# Patient Record
Sex: MALE | Race: WHITE | NOT HISPANIC OR LATINO
[De-identification: names, ages, dates, MRNs, and addresses within clinical notes are randomized per-mention and may not be internally consistent; named-entity substitution may affect disease eponyms.]

---
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
## 2023-03-13 ENCOUNTER — LABORATORY RESULT (OUTPATIENT)
Age: 23
End: 2023-03-13

## 2023-03-14 ENCOUNTER — APPOINTMENT (OUTPATIENT)
Dept: UROLOGY | Facility: CLINIC | Age: 23
End: 2023-03-14
Payer: COMMERCIAL

## 2023-03-14 ENCOUNTER — TRANSCRIPTION ENCOUNTER (OUTPATIENT)
Age: 23
End: 2023-03-14

## 2023-03-14 VITALS
WEIGHT: 115 LBS | RESPIRATION RATE: 18 BRPM | BODY MASS INDEX: 18.48 KG/M2 | OXYGEN SATURATION: 99 % | HEART RATE: 82 BPM | HEIGHT: 66 IN | SYSTOLIC BLOOD PRESSURE: 107 MMHG | DIASTOLIC BLOOD PRESSURE: 67 MMHG

## 2023-03-14 DIAGNOSIS — N39.9 DISORDER OF URINARY SYSTEM, UNSPECIFIED: ICD-10-CM

## 2023-03-14 PROCEDURE — 99204 OFFICE O/P NEW MOD 45 MIN: CPT

## 2023-03-14 NOTE — HISTORY OF PRESENT ILLNESS
[FreeTextEntry1] : RAMON ARVIZU is a 23 year old male hx Asperger Syndrome who presents for consultation for periodic nocturia.\par  \par Ramon is here with his father who assisted w history.\par Reports that recently had episodes of nocturia where he was waking up possibly due to the need to void with minimal output.  At this point he is not having the symptoms.\aditi Had this problem approximate 10 years ago which self resolved., has been seen by Peds urology specialist where they said "his ureter/urethra is growing faster than (unclear)" according to father . He denies gross hematuria, dysuria or associated symptoms. He not constipated and reports a BM everyday \par \par Renal ultrasound images visualized from patients past pediatric urology evaluation and although there is no report on my read there is no hydronephrosis bilaterally kidneys appear unremarkable.\par \par Denies  PMH including previous kidney stones, recurrent UTIs. no hx pyelo or hospitalizations for urologic causes\par \par

## 2023-03-14 NOTE — ASSESSMENT
[FreeTextEntry1] : DELIA ARVIZU is a 23 year old male hx Asperger Syndrome who presents for consultation for periodic nocturia.\par  \par Symptoms now nearly resolved however recommending imaging as there was question of anatomical abnormality as per fathers history.\par Suspect behavioral causes\par \par Plan \par RBUS \par UA, UCX \par diagnosis and management impacted by social determinants of health as patient is a poor historian

## 2023-03-14 NOTE — ADDENDUM
[FreeTextEntry1] : Patient's note was transcribed with the assistance of a medical scribe under the supervision of Dr. Shaikh.\par I, Dr. Shaikh, have reviewed the patient's chart and agree that it aligns with my medical decisions.\par Angela Daly, our scribe, also served as a chaperone for physical examination purposes.\par \par \par

## 2023-03-14 NOTE — PHYSICAL EXAM
[General Appearance - Well Developed] : well developed [General Appearance - Well Nourished] : well nourished [Normal Appearance] : normal appearance [Well Groomed] : well groomed [General Appearance - In No Acute Distress] : no acute distress [Edema] : no peripheral edema [Respiration, Rhythm And Depth] : normal respiratory rhythm and effort [Exaggerated Use Of Accessory Muscles For Inspiration] : no accessory muscle use [Abdomen Soft] : soft [Abdomen Tenderness] : non-tender [Costovertebral Angle Tenderness] : no ~M costovertebral angle tenderness [Urethral Meatus] : meatus normal [Urinary Bladder Findings] : the bladder was normal on palpation [Scrotum] : the scrotum was normal [Normal Station and Gait] : the gait and station were normal for the patient's age [] : no rash [No Focal Deficits] : no focal deficits [Oriented To Time, Place, And Person] : oriented to person, place, and time [Affect] : the affect was normal [Mood] : the mood was normal [Not Anxious] : not anxious [No Palpable Adenopathy] : no palpable adenopathy [Testes Mass (___cm)] : there were no testicular masses

## 2023-03-16 ENCOUNTER — NON-APPOINTMENT (OUTPATIENT)
Age: 23
End: 2023-03-16

## 2023-03-16 LAB — BACTERIA UR CULT: NORMAL

## 2023-06-13 ENCOUNTER — APPOINTMENT (OUTPATIENT)
Dept: UROLOGY | Facility: CLINIC | Age: 23
End: 2023-06-13
Payer: COMMERCIAL

## 2023-06-13 VITALS
HEART RATE: 80 BPM | BODY MASS INDEX: 18.48 KG/M2 | WEIGHT: 115 LBS | TEMPERATURE: 97.1 F | HEIGHT: 66 IN | SYSTOLIC BLOOD PRESSURE: 108 MMHG | DIASTOLIC BLOOD PRESSURE: 60 MMHG | OXYGEN SATURATION: 96 % | RESPIRATION RATE: 16 BRPM

## 2023-06-13 DIAGNOSIS — R35.1 NOCTURIA: ICD-10-CM

## 2023-06-13 DIAGNOSIS — R80.9 PROTEINURIA, UNSPECIFIED: ICD-10-CM

## 2023-06-13 PROCEDURE — 99214 OFFICE O/P EST MOD 30 MIN: CPT

## 2023-06-13 NOTE — ASSESSMENT
[FreeTextEntry1] : DELIA ARVIZU is a 23 year old male hx Asperger Syndrome who presents for consultation for periodic nocturia.\par Proteinuria found on urinalysis\par \par Suspect behavioral causes of periodic nocturia. We discussed options such as further work-up with more invasive testing versus observation, pt and his father opt for observation at this time . \par \par plan \par Follow-up with primary care provider regarding proteinuria\par Recommending conservative management of nocturia at this time with eliminating fluids two hours before sleep, voiding prior to bedtime, decreasing bladder irritants\par f/u PRN \par \par \par \par Symptoms now nearly resolved however recommending imaging as there was question of anatomical abnormality as per fathers history.\par Suspect behavioral causes\par \par Plan \par RBUS \par UA, UCX \par diagnosis and management impacted by social determinants of health as patient is a poor historian

## 2023-06-13 NOTE — HISTORY OF PRESENT ILLNESS
[FreeTextEntry1] : RAMON ARVIZU is a 23 year old male hx Asperger Syndrome who presents for consultation for periodic nocturia.\par  \par Pt again had one episode of nocturia where he was waking up frequently to urinate. He denies any gross hematuria,dysuria or associated symptoms.  However this only occurred 1 time and otherwise no issues no constipation.Ramon is here with his father who assisted w history.\par \par RBUS 05/2023 images visualized from regional radiology and agree with the findings below.-  No hydronephrosis or shadowing renal stone with PVR of 23 ccs bladder is unremarkable\par UA 03/16/23 - proteinuria 30 mg/dL  \par \par previously \par Had this problem approximate 10 years ago which self resolved., has been seen by [facility] urology specialist where they said "his ureter/urethra is growing faster than (unclear)" according to father . He denies gross hematuria, dysuria or associated symptoms. He not constipated and reports a BM everyday \par \par Renal ultrasound images from patients past pediatric urology evaluation and although there is no report on my read there is no hydronephrosis bilaterally kidneys appear unremarkable.\par \par Denies  PMH including previous kidney stones, recurrent UTIs. no hx pyelo or hospitalizations for urologic causes\par \par